# Patient Record
Sex: FEMALE | Race: WHITE | NOT HISPANIC OR LATINO | Employment: UNEMPLOYED | URBAN - METROPOLITAN AREA
[De-identification: names, ages, dates, MRNs, and addresses within clinical notes are randomized per-mention and may not be internally consistent; named-entity substitution may affect disease eponyms.]

---

## 2018-11-07 ENCOUNTER — HOSPITAL ENCOUNTER (EMERGENCY)
Facility: MEDICAL CENTER | Age: 33
End: 2018-11-07
Attending: EMERGENCY MEDICINE
Payer: MEDICAID

## 2018-11-07 VITALS
TEMPERATURE: 98.4 F | SYSTOLIC BLOOD PRESSURE: 139 MMHG | BODY MASS INDEX: 36.76 KG/M2 | HEIGHT: 63 IN | DIASTOLIC BLOOD PRESSURE: 88 MMHG | RESPIRATION RATE: 18 BRPM | WEIGHT: 207.45 LBS | HEART RATE: 81 BPM | OXYGEN SATURATION: 97 %

## 2018-11-07 DIAGNOSIS — J02.9 PHARYNGITIS, UNSPECIFIED ETIOLOGY: ICD-10-CM

## 2018-11-07 PROCEDURE — A9270 NON-COVERED ITEM OR SERVICE: HCPCS | Performed by: EMERGENCY MEDICINE

## 2018-11-07 PROCEDURE — 99284 EMERGENCY DEPT VISIT MOD MDM: CPT

## 2018-11-07 PROCEDURE — 700102 HCHG RX REV CODE 250 W/ 637 OVERRIDE(OP): Performed by: EMERGENCY MEDICINE

## 2018-11-07 RX ORDER — ACETAMINOPHEN 325 MG/1
650 TABLET ORAL ONCE
Status: COMPLETED | OUTPATIENT
Start: 2018-11-07 | End: 2018-11-07

## 2018-11-07 RX ORDER — AMOXICILLIN AND CLAVULANATE POTASSIUM 875; 125 MG/1; MG/1
1 TABLET, FILM COATED ORAL ONCE
Status: COMPLETED | OUTPATIENT
Start: 2018-11-07 | End: 2018-11-07

## 2018-11-07 RX ORDER — IBUPROFEN 600 MG/1
600 TABLET ORAL ONCE
Status: COMPLETED | OUTPATIENT
Start: 2018-11-07 | End: 2018-11-07

## 2018-11-07 RX ORDER — AMOXICILLIN AND CLAVULANATE POTASSIUM 875; 125 MG/1; MG/1
1 TABLET, FILM COATED ORAL 2 TIMES DAILY
Qty: 14 TAB | Refills: 0 | Status: SHIPPED | OUTPATIENT
Start: 2018-11-07 | End: 2018-11-14

## 2018-11-07 RX ORDER — DEXAMETHASONE 4 MG/1
4 TABLET ORAL EVERY 12 HOURS
Status: DISCONTINUED | OUTPATIENT
Start: 2018-11-07 | End: 2018-11-07 | Stop reason: HOSPADM

## 2018-11-07 RX ADMIN — ACETAMINOPHEN 650 MG: 325 TABLET, FILM COATED ORAL at 07:29

## 2018-11-07 RX ADMIN — AMOXICILLIN AND CLAVULANATE POTASSIUM 1 TABLET: 875; 125 TABLET, FILM COATED ORAL at 07:29

## 2018-11-07 RX ADMIN — IBUPROFEN 600 MG: 600 TABLET ORAL at 07:29

## 2018-11-07 RX ADMIN — DEXAMETHASONE 4 MG: 4 TABLET ORAL at 07:29

## 2018-11-07 ASSESSMENT — PAIN SCALES - GENERAL
PAINLEVEL_OUTOF10: 10
PAINLEVEL_OUTOF10: 8

## 2018-11-07 ASSESSMENT — PAIN SCALES - WONG BAKER: WONGBAKER_NUMERICALRESPONSE: HURTS AS MUCH AS POSSIBLE

## 2018-11-07 NOTE — ED NOTES
Med Rec Updated and Complete per Pt at bedside  Allergies Reviewed    Pt states she took two Amoxicillin pills 11/06/18 PM leftover from an old RX.     Pt denies any regular RX medication or OTC medication at this time.

## 2018-11-07 NOTE — ED PROVIDER NOTES
"ED Provider Note    CHIEF COMPLAINT  Chief Complaint   Patient presents with   • Sore Throat       HPI  Teresa Mike is a 32 y.o. female who presents to the emergency department the chief complaint of sore throat.  Sore throat started yesterday.  She has had a subjective fever.  No significant runny nose or cough.  No vomiting.  No nausea.  No chest pain or abdominal pain.  Patient essentially has an isolated sore throat.    REVIEW OF SYSTEMS  See HPI for further details. All other systems are negative.     PAST MEDICAL HISTORY  Past Medical History:   Diagnosis Date   • Mononucleosis        FAMILY HISTORY  History reviewed. No pertinent family history.    SOCIAL HISTORY  Social History     Social History   • Marital status: N/A     Spouse name: N/A   • Number of children: N/A   • Years of education: N/A     Social History Main Topics   • Smoking status: Current Every Day Smoker   • Smokeless tobacco: Never Used   • Alcohol use Not on file   • Drug use: Unknown   • Sexual activity: Not on file     Other Topics Concern   • Not on file     Social History Narrative   • No narrative on file       SURGICAL HISTORY  Past Surgical History:   Procedure Laterality Date   • APPENDECTOMY     • GYN SURGERY      c-sections       CURRENT MEDICATIONS  Home Medications     Reviewed by Una Osorio (Pharmacy Tech) on 11/07/18 at 0720  Med List Status: Complete   Medication Last Dose Status   AMOXICILLIN PO 11/6/2018 Active                ALLERGIES  No Known Allergies    PHYSICAL EXAM  VITAL SIGNS: /99   Pulse 80   Temp 36.9 °C (98.4 °F)   Resp 17   Ht 1.6 m (5' 3\")   Wt 94.1 kg (207 lb 7.3 oz)   SpO2 96%   BMI 36.75 kg/m²   Constitutional: Well developed, Well nourished, No acute distress, Non-toxic appearance.   HENT: Normocephalic, Atraumatic, Bilateral external ears normal, posterior pharynx is erythematous.  There is minimal associated swelling.  The uvula is midline.  There is no peritonsillar " abscess.  Airway is patent.    Eyes: PERRLA, EOMI, Conjunctiva normal, No discharge.   Neck: Normal range of motion, No tenderness, Supple, No stridor.   Cardiovascular: Regular rate and rhythm, no audible murmur  Thorax & Lungs: Clear to auscultation bilaterally.  No rales, rhonchi, or wheezing.  Abdomen: Bowel sounds normal, Soft, No tenderness, No masses, No pulsatile masses.   Skin: Warm, Dry, No erythema, No rash.   Back: No tenderness, No CVA tenderness.   Extremities: Intact distal pulses, No tenderness, No cyanosis, No clubbing.  Neurologic: Alert & oriented x 3, Normal motor function, Normal sensory function, No focal deficits noted.     EKG      RADIOLOGY/PROCEDURES      COURSE & MEDICAL DECISION MAKING  Pertinent Labs & Imaging studies reviewed. (See chart for details)    Patient presents today with a sore throat.  Physical exam is remarkable for findings consistent with pharyngitis.  No evidence of peritonsillar abscess.  I feel that empiric treatment with Augmentin and Decadron is appropriate.  First dose is provided in the emergency department.  Patient is discharged home in stable condition.  Primary care follow-up.    he patient will return for new or worsening symptoms and is stable at the time of discharge.    The patient is referred to a primary physician for blood pressure management, diabetic screening, and for all other preventative health concerns.    DISPOSITION:  Patient will be discharged home in stable condition.    FOLLOW UP:  [facility], Emergency Dept  [address]  [facility] [address]  [phone]    If symptoms worsen    [address] [address]  [phone]          OUTPATIENT MEDICATIONS:  New Prescriptions    AMOXICILLIN-CLAVULANATE (AUGMENTIN) 875-125 MG TAB    Take 1 Tab by mouth 2 times a day for 7 days.       FINAL IMPRESSION  1. Pharyngitis, unspecified etiology              Electronically signed by:  Richard Valencia, 11/7/2018 7:21 AM

## 2018-11-25 ENCOUNTER — HOSPITAL ENCOUNTER (EMERGENCY)
Facility: MEDICAL CENTER | Age: 33
End: 2018-11-25
Attending: EMERGENCY MEDICINE
Payer: MEDICAID

## 2018-11-25 VITALS
WEIGHT: 205.03 LBS | HEART RATE: 90 BPM | SYSTOLIC BLOOD PRESSURE: 140 MMHG | DIASTOLIC BLOOD PRESSURE: 92 MMHG | OXYGEN SATURATION: 97 % | TEMPERATURE: 98.5 F | RESPIRATION RATE: 18 BRPM | HEIGHT: 63 IN | BODY MASS INDEX: 36.33 KG/M2

## 2018-11-25 DIAGNOSIS — J40 BRONCHITIS: ICD-10-CM

## 2018-11-25 PROCEDURE — 99284 EMERGENCY DEPT VISIT MOD MDM: CPT

## 2018-11-25 PROCEDURE — 700111 HCHG RX REV CODE 636 W/ 250 OVERRIDE (IP): Performed by: EMERGENCY MEDICINE

## 2018-11-25 RX ORDER — AMOXICILLIN 500 MG/1
500 CAPSULE ORAL 2 TIMES DAILY
Status: SHIPPED | COMMUNITY
End: 2018-11-27

## 2018-11-25 RX ORDER — ONDANSETRON 4 MG/1
4 TABLET, ORALLY DISINTEGRATING ORAL ONCE
Qty: 10 TAB | Refills: 0 | Status: SHIPPED | OUTPATIENT
Start: 2018-11-25 | End: 2018-11-25

## 2018-11-25 RX ORDER — ONDANSETRON 4 MG/1
4 TABLET, ORALLY DISINTEGRATING ORAL ONCE
Status: COMPLETED | OUTPATIENT
Start: 2018-11-25 | End: 2018-11-25

## 2018-11-25 RX ORDER — ALBUTEROL SULFATE 90 UG/1
2 AEROSOL, METERED RESPIRATORY (INHALATION)
Qty: 1 INHALER | Refills: 2 | Status: SHIPPED | OUTPATIENT
Start: 2018-11-25 | End: 2022-05-03

## 2018-11-25 RX ORDER — BENZONATATE 100 MG/1
100 CAPSULE ORAL 3 TIMES DAILY PRN
Qty: 60 CAP | Refills: 0 | Status: SHIPPED | OUTPATIENT
Start: 2018-11-25 | End: 2022-05-03

## 2018-11-25 RX ADMIN — ONDANSETRON 4 MG: 4 TABLET, ORALLY DISINTEGRATING ORAL at 10:30

## 2018-11-25 ASSESSMENT — PAIN SCALES - GENERAL: PAINLEVEL_OUTOF10: 3

## 2018-11-25 NOTE — ED NOTES
Presents complaining of sore throat. She has just completed a bout of antibiotics to address a diagnosed strep.  Continues to experience pain.

## 2018-11-25 NOTE — ED NOTES
Med rec updated and complete  Allergies reviewed  Interviewed pt with family at bedside with permission from pt.  Pt reports no vitamins or OTC's.  Pt reports that she took an old RX of antibiotics, pt started on 11/6/2018 and took them every once in awhile.  Pt reports that she finished them 2 days ago, but never picked up the antibiotics on 11/7/2018 or the IBUPROFEN 600MG.

## 2018-11-25 NOTE — ED PROVIDER NOTES
"ED Provider Note    CHIEF COMPLAINT  Chief Complaint   Patient presents with   • Sore Throat       HPI  Teresa Mike is a 32 y.o. female who presents complaining of sore throat cough runny nose congestion.  She was seen and diagnosed with strep throat placed on Decadron which did help a lot and Augmentin which she did not finish.  She has a recurrence she feels like she has cough runny nose sore throat comes in for evaluation.  All other systems negative    REVIEW OF SYSTEMS  See HPI for further details. All other systems are negative.      PAST MEDICAL HISTORY  Past Medical History:   Diagnosis Date   • Mononucleosis        FAMILY HISTORY  History reviewed. No pertinent family history.    SOCIAL HISTORY  Social History     Social History   • Marital status:      Spouse name: N/A   • Number of children: N/A   • Years of education: N/A     Social History Main Topics   • Smoking status: Current Every Day Smoker   • Smokeless tobacco: Never Used   • Alcohol use No   • Drug use: Unknown      Comment: Marijuana   • Sexual activity: Not on file     Other Topics Concern   • Not on file     Social History Narrative   • No narrative on file       SURGICAL HISTORY  Past Surgical History:   Procedure Laterality Date   • APPENDECTOMY     • GYN SURGERY      c-sections       CURRENT MEDICATIONS  Home Medications     Reviewed by Una Guevara (Pharmacy Tech) on 11/25/18 at 0958  Med List Status: Complete   Medication Last Dose Status   amoxicillin (AMOXIL) 500 MG Cap > 2 days Active                ALLERGIES  No Known Allergies    PHYSICAL EXAM  VITAL SIGNS: /78   Pulse 89   Temp 37 °C (98.6 °F) (Temporal)   Resp 18   Ht 1.6 m (5' 3\")   Wt 93 kg (205 lb 0.4 oz)   SpO2 96%   BMI 36.32 kg/m²       Constitutional :  Well developed, Well nourished, No acute distress, Non-toxic appearance.   HENT: Pharynx is clear moist mucous membrane  Eyes: No conjunctivitis or icterus  Neck: Normal range of " motion, No tenderness, Supple, No stridor.   Lymphatic: Negative anterior cervical lymphadenopathy  Cardiovascular: Normal heart rate, Normal rhythm, No murmurs, No rubs, No gallops.   Thorax & Lungs: Wheezing with coughing  Skin: Warm, Dry, No erythema, No rash.   Extremities: Intact distal pulses, No edema, No tenderness, No cyanosis, No clubbing.           COURSE & MEDICAL DECISION MAKING  Pertinent Labs & Imaging studies reviewed. (See chart for details)  Patient has no obvious signs of pharyngitis she has wheezing with coughing she is a smoker.  I think she has upper respiratory infections precipitated bronchitis from smoking.  I placed the patient on albuterol inhaler Tessalon for cough she does not require antibiotics at this time she given return precautions and follow-up    FINAL IMPRESSION  1.  Bronchitis  2.   3.      Electronically signed by: Asaf Saeed, 11/25/2018

## 2018-11-26 NOTE — ED NOTES
Outpatient pharmacy called regarding ondansetron rx.   Rx clarified to:  Ondansetron 4 mg ODT, take 1 tab po q6 hours prn n/v, #10, no refills.    Lindsya Quiles, ScarletD

## 2018-11-27 ENCOUNTER — HOSPITAL ENCOUNTER (EMERGENCY)
Facility: MEDICAL CENTER | Age: 33
End: 2018-11-27
Attending: EMERGENCY MEDICINE
Payer: MEDICAID

## 2018-11-27 VITALS
HEIGHT: 63 IN | OXYGEN SATURATION: 96 % | TEMPERATURE: 96.8 F | SYSTOLIC BLOOD PRESSURE: 148 MMHG | RESPIRATION RATE: 14 BRPM | HEART RATE: 84 BPM | WEIGHT: 202.82 LBS | DIASTOLIC BLOOD PRESSURE: 104 MMHG | BODY MASS INDEX: 35.94 KG/M2

## 2018-11-27 DIAGNOSIS — J02.0 STREP PHARYNGITIS: ICD-10-CM

## 2018-11-27 LAB
S PYO AG THROAT QL: ABNORMAL
SIGNIFICANT IND 70042: ABNORMAL
SITE SITE: ABNORMAL
SOURCE SOURCE: ABNORMAL

## 2018-11-27 PROCEDURE — 99284 EMERGENCY DEPT VISIT MOD MDM: CPT

## 2018-11-27 PROCEDURE — 700111 HCHG RX REV CODE 636 W/ 250 OVERRIDE (IP)

## 2018-11-27 PROCEDURE — 87880 STREP A ASSAY W/OPTIC: CPT

## 2018-11-27 RX ORDER — DEXAMETHASONE SODIUM PHOSPHATE 4 MG/ML
INJECTION, SOLUTION INTRA-ARTICULAR; INTRALESIONAL; INTRAMUSCULAR; INTRAVENOUS; SOFT TISSUE
Status: COMPLETED
Start: 2018-11-27 | End: 2018-11-27

## 2018-11-27 RX ORDER — DEXAMETHASONE SODIUM PHOSPHATE 10 MG/ML
10 INJECTION, SOLUTION INTRAMUSCULAR; INTRAVENOUS ONCE
Status: DISCONTINUED | OUTPATIENT
Start: 2018-11-27 | End: 2018-11-27

## 2018-11-27 RX ORDER — DEXAMETHASONE SODIUM PHOSPHATE 4 MG/ML
10 INJECTION, SOLUTION INTRA-ARTICULAR; INTRALESIONAL; INTRAMUSCULAR; INTRAVENOUS; SOFT TISSUE ONCE
Status: COMPLETED | OUTPATIENT
Start: 2018-11-27 | End: 2018-11-27

## 2018-11-27 RX ORDER — AMOXICILLIN 500 MG/1
500 CAPSULE ORAL 3 TIMES DAILY
Qty: 30 CAP | Refills: 0 | Status: SHIPPED | OUTPATIENT
Start: 2018-11-27 | End: 2018-12-07

## 2018-11-27 RX ADMIN — DEXAMETHASONE SODIUM PHOSPHATE 10 MG: 4 INJECTION, SOLUTION INTRAMUSCULAR; INTRAVENOUS at 08:53

## 2018-11-27 RX ADMIN — DEXAMETHASONE SODIUM PHOSPHATE 10 MG: 4 INJECTION, SOLUTION INTRA-ARTICULAR; INTRALESIONAL; INTRAMUSCULAR; INTRAVENOUS; SOFT TISSUE at 08:53

## 2018-11-27 ASSESSMENT — PAIN SCALES - GENERAL
PAINLEVEL_OUTOF10: 10
PAINLEVEL_OUTOF10: 10

## 2018-11-27 NOTE — ED NOTES
"Patient states that she was seen 2 weeks ago for strep throat.  She was given \"a steroid and an antibiotic.  I felt better after the steroid and didn't take the antibiotic because I didn't feel better.  "

## 2018-11-27 NOTE — ED PROVIDER NOTES
"ED Provider Note    CHIEF COMPLAINT  Chief Complaint   Patient presents with   • Sore Throat     per report, \"I have Strep Throat,\" pt was seen on 11/25/18 for c/o same, requesting Antibx       HPI  Teresa Mike is a 32 y.o. female who presents with sore throat, previously seen on November 25, stating she was not improving, having been diagnosed with upper respiratory infection/bronchitis at the time and treated with albuterol and Tessalon.  2 weeks previous had been diagnosed with strep and treated with antibiotics and steroids.  Previous note from states patient started Augmentin but did not finish, patient states she never started the antibiotic at all.  Patient stated she was treated with Decadron last time, felt much better for 4 days and felt like she did not need the antibiotic.  Patient requesting antibiotics at this time.  No difficulty breathing.  She is able to tolerate oral intake, pain is worse with swallowing.    REVIEW OF SYSTEMS  Constitutional: Subjective fever  Respiratory: No cough or shortness of breath  ENT sore throat  Gastrointestinal: No abdominal pain  Musculoskeletal: No back pain    PAST MEDICAL HISTORY  Past Medical History:   Diagnosis Date   • Mononucleosis        FAMILY HISTORY  History reviewed. No pertinent family history.    SOCIAL HISTORY  Social History     Social History   • Marital status:      Spouse name: N/A   • Number of children: N/A   • Years of education: N/A     Social History Main Topics   • Smoking status: Current Every Day Smoker   • Smokeless tobacco: Never Used   • Alcohol use No   • Drug use: Yes      Comment: Marijuana   • Sexual activity: Not on file     Other Topics Concern   • Not on file     Social History Narrative   • No narrative on file       SURGICAL HISTORY  Past Surgical History:   Procedure Laterality Date   • APPENDECTOMY     • GYN SURGERY      c-sections       CURRENT MEDICATIONS  No current facility-administered medications on file " "prior to encounter.      Current Outpatient Prescriptions on File Prior to Encounter   Medication Sig Dispense Refill   • albuterol 108 (90 Base) MCG/ACT Aero Soln inhalation aerosol Inhale 2 Puffs by mouth every 2 hours as needed for Shortness of Breath (or cough). 1 Inhaler 2   • benzonatate (TESSALON) 100 MG Cap Take 1 Cap by mouth 3 times a day as needed for Cough. 60 Cap 0       ALLERGIES  No Known Allergies    PHYSICAL EXAM  VITAL SIGNS: /104   Pulse 84   Temp 36 °C (96.8 °F)   Resp 14   Ht 1.6 m (5' 3\")   Wt 92 kg (202 lb 13.2 oz)   SpO2 96%   BMI 35.93 kg/m²   Constitutional:  Well nourished, No acute distress.   HENT: Posterior pharynx erythematous change, no asymmetric swelling, no exudate  Lymphatics: Bilateral tender submandibular adenopathy  Eyes:  Conjunctiva normal, No discharge.    Cardiovascular: The heart is regular rhythm, normal rate  Pulmonary: Lungs are clear, no wheezing, no rales  Skin: No cyanosis.   Musculoskeletal: Neck nontender   Neurologic: speech is clear, no ataxia   Psychiatric:  Mood normal.  Cooperative    Results for orders placed or performed during the hospital encounter of 11/27/18   RAPID STREP,CULT IF INDICATED   Result Value Ref Range    Significant Indicator POS (POS)     Source THRT     Site THROAT     Rapid Strep Screen Positive for Group A streptococcus. (A)            COURSE & MEDICAL DECISION MAKING  Pertinent Labs & Imaging studies reviewed. (See chart for details)  Patient's strep test returns positive, appears to be persistent secondary to noncompliance with medical regimen.  Patient is prescribed antibiotics, repeat dose of Decadron has been provided.  She is well-appearing upon discharge.  I have advised patient to see  for recheck if not better in 1 week, returning sooner if worse or for any concerns    FINAL IMPRESSION     1. Strep pharyngitis                 Electronically signed by: Eddie Wong, 12/1/2018 6:21 AM    "

## 2018-11-27 NOTE — ED NOTES
ERP in to assess patient and discuss the importance of taking the antibiotic to treat the infection.

## 2018-11-27 NOTE — ED NOTES
"Pt was roomed to 6H. Pt states \"I'm not talking to a doctor out here\" This RN asked pt if she preferred to wait for open room. Pt stated \"Yes\"  Pt was returned to lobby to wait for open room.   "

## 2021-11-17 ENCOUNTER — TELEPHONE (OUTPATIENT)
Dept: SCHEDULING | Facility: IMAGING CENTER | Age: 36
End: 2021-11-17

## 2022-01-04 ENCOUNTER — TELEPHONE (OUTPATIENT)
Dept: SCHEDULING | Facility: IMAGING CENTER | Age: 37
End: 2022-01-04

## 2022-01-17 ENCOUNTER — TELEPHONE (OUTPATIENT)
Dept: SCHEDULING | Facility: IMAGING CENTER | Age: 37
End: 2022-01-17

## 2022-05-02 ENCOUNTER — TELEPHONE (OUTPATIENT)
Dept: SCHEDULING | Facility: IMAGING CENTER | Age: 37
End: 2022-05-02

## 2022-05-03 ENCOUNTER — OFFICE VISIT (OUTPATIENT)
Dept: MEDICAL GROUP | Facility: MEDICAL CENTER | Age: 37
End: 2022-05-03
Attending: NURSE PRACTITIONER
Payer: MEDICAID

## 2022-05-03 VITALS
SYSTOLIC BLOOD PRESSURE: 130 MMHG | TEMPERATURE: 96.7 F | BODY MASS INDEX: 39.35 KG/M2 | RESPIRATION RATE: 16 BRPM | HEIGHT: 64 IN | DIASTOLIC BLOOD PRESSURE: 82 MMHG | WEIGHT: 230.5 LBS | HEART RATE: 70 BPM | OXYGEN SATURATION: 100 %

## 2022-05-03 DIAGNOSIS — Z13.228 SCREENING FOR ENDOCRINE, NUTRITIONAL, METABOLIC AND IMMUNITY DISORDER: ICD-10-CM

## 2022-05-03 DIAGNOSIS — Z23 NEED FOR VACCINATION: ICD-10-CM

## 2022-05-03 DIAGNOSIS — Z13.0 SCREENING FOR ENDOCRINE, NUTRITIONAL, METABOLIC AND IMMUNITY DISORDER: ICD-10-CM

## 2022-05-03 DIAGNOSIS — F43.21 GRIEF: ICD-10-CM

## 2022-05-03 DIAGNOSIS — F43.21 SITUATIONAL DEPRESSION: ICD-10-CM

## 2022-05-03 DIAGNOSIS — Z13.21 SCREENING FOR ENDOCRINE, NUTRITIONAL, METABOLIC AND IMMUNITY DISORDER: ICD-10-CM

## 2022-05-03 DIAGNOSIS — Z13.29 SCREENING FOR ENDOCRINE, NUTRITIONAL, METABOLIC AND IMMUNITY DISORDER: ICD-10-CM

## 2022-05-03 DIAGNOSIS — Z76.89 ENCOUNTER TO ESTABLISH CARE: ICD-10-CM

## 2022-05-03 PROCEDURE — 99204 OFFICE O/P NEW MOD 45 MIN: CPT | Performed by: NURSE PRACTITIONER

## 2022-05-03 PROCEDURE — 99203 OFFICE O/P NEW LOW 30 MIN: CPT | Mod: 25 | Performed by: NURSE PRACTITIONER

## 2022-05-03 PROCEDURE — 90715 TDAP VACCINE 7 YRS/> IM: CPT

## 2022-05-03 ASSESSMENT — PATIENT HEALTH QUESTIONNAIRE - PHQ9
CLINICAL INTERPRETATION OF PHQ2 SCORE: 4
5. POOR APPETITE OR OVEREATING: 3 - NEARLY EVERY DAY
SUM OF ALL RESPONSES TO PHQ QUESTIONS 1-9: 19

## 2022-05-03 NOTE — ASSESSMENT & PLAN NOTE
Discussed health history and maintenance   Flu vaccine - Not available   Colon Ca screening - Not applicable   Mammogram- Not Applicable   Pap smear - Done within last 3 years   STD Screening- Declined   Preventative screening labs ordered - Will follow up with me in 3 weeks   Tdap provided in clinic

## 2022-05-03 NOTE — ASSESSMENT & PLAN NOTE
Acute-   Discussed possible medication to help manage symptoms until she can get established with therapist.   Referral urgently placed to Psychology  Referral to Psychiatrist  Will be following up with me in 3 weeks to ensure she has been able to establish with therapy and go over labs  No concerns for self harm or SI  PHQ9 in office completed:  Depression Screening    Little interest or pleasure in doing things?  2 - more than half the days   Feeling down, depressed , or hopeless? 2 - more than half the days   Trouble falling or staying asleep, or sleeping too much?  2 - more than half the days   Feeling tired or having little energy?  3 - nearly every day   Poor appetite or overeating?  3 - nearly every day   Feeling bad about yourself - or that you are a failure or have let yourself or your family down? 2 - more than half the days   Trouble concentrating on things, such as reading the newspaper or watching television? 3 - nearly every day   Moving or speaking so slowly that other people could have noticed.  Or the opposite - being so fidgety or restless that you have been moving around a lot more than usual?  2 - more than half the days   Thoughts that you would be better off dead, or of hurting yourself?  0 - not at all   Patient Health Questionnaire Score: 19       If depressive symptoms identified deferred to follow up visit unless specifically addressed in assesment and plan.    Interpretation of PHQ-9 Total Score   Score Severity   1-4 No Depression   5-9 Mild Depression   10-14 Moderate Depression   15-19 Moderately Severe Depression   20-27 Severe Depression

## 2022-05-03 NOTE — PROGRESS NOTES
Chief Complaint   Patient presents with   • Establish Care       Subjective:     HPI:   Teresa Mike is a 36 y.o. female here to discuss the evaluation and management of:        Problem   Situational Depression    Patient very tearful in office, recently lost her  secondary to an epileptic seizure a few months ago. Has been trying to stay strong for her young children (10 & 4). Patient has been having trouble getting through her grief, and processing to move forward. Would like to see therapist and psychiatrist.      Encounter to Establish Care    Patient here to establish care. Recently lost her  secondary to an epileptic seizure and had been trying to stay strong for her 10 and 4 year old children. Would now like to get her health checked out to ensure she is healthy for them.            ROS  See HPI       No Known Allergies    Current medicines (including changes today)  No current outpatient medications on file.     No current facility-administered medications for this visit.       Social History     Tobacco Use   • Smoking status: Former Smoker   • Smokeless tobacco: Never Used   • Tobacco comment: Pt state quit date is DEC2021 CS05/03/2021   Vaping Use   • Vaping Use: Never used   Substance Use Topics   • Alcohol use: No   • Drug use: Yes     Types: Marijuana, Oral     Comment: Pt states she uses 2-3 time per week to help with anxiety and sleep since her  passed apx 8 mo. ago CS05/03/2021       Patient Active Problem List    Diagnosis Date Noted   • Situational depression 05/03/2022   • Encounter to establish care 05/03/2022       Family History   Problem Relation Age of Onset   • Hypertension Mother    • Hypertension Maternal Aunt    • Breast Cancer Maternal Aunt 60   • Hypertension Maternal Grandmother           Objective:     /82 (BP Location: Left arm, Patient Position: Sitting, BP Cuff Size: Adult)   Pulse 70   Temp 35.9 °C (96.7 °F) (Temporal)   Resp 16   Ht 1.626  "m (5' 4\")   Wt 105 kg (230 lb 8 oz)   SpO2 100%  Body mass index is 39.57 kg/m².    Physical Exam:  Physical Exam  Vitals reviewed.   Constitutional:       General: She is awake.      Appearance: Normal appearance. She is well-developed.   HENT:      Head: Normocephalic.      Nose: Nose normal.      Mouth/Throat:      Mouth: Mucous membranes are moist.      Pharynx: Oropharynx is clear. Posterior oropharyngeal erythema present. No oropharyngeal exudate.   Eyes:      Conjunctiva/sclera: Conjunctivae normal.      Pupils: Pupils are equal, round, and reactive to light.   Cardiovascular:      Rate and Rhythm: Normal rate and regular rhythm.      Heart sounds: Normal heart sounds.   Pulmonary:      Effort: Pulmonary effort is normal. No respiratory distress.      Breath sounds: Normal breath sounds. No wheezing.   Musculoskeletal:      Cervical back: Neck supple. No tenderness.   Lymphadenopathy:      Cervical: No cervical adenopathy.   Skin:     General: Skin is warm and dry.   Neurological:      Mental Status: She is alert and oriented to person, place, and time.   Psychiatric:         Mood and Affect: Mood is depressed. Affect is tearful.         Behavior: Behavior normal. Behavior is cooperative.         Assessment and Plan:     The following treatment plan was discussed:    Problem List Items Addressed This Visit     Situational depression     Acute-   Discussed possible medication to help manage symptoms until she can get established with therapist.   Referral urgently placed to Psychology  Referral to Psychiatrist  Will be following up with me in 3 weeks to ensure she has been able to establish with therapy and go over labs  No concerns for self harm or SI  PHQ9 in office completed:  Depression Screening    Little interest or pleasure in doing things?  2 - more than half the days   Feeling down, depressed , or hopeless? 2 - more than half the days   Trouble falling or staying asleep, or sleeping too much?  2 - " more than half the days   Feeling tired or having little energy?  3 - nearly every day   Poor appetite or overeating?  3 - nearly every day   Feeling bad about yourself - or that you are a failure or have let yourself or your family down? 2 - more than half the days   Trouble concentrating on things, such as reading the newspaper or watching television? 3 - nearly every day   Moving or speaking so slowly that other people could have noticed.  Or the opposite - being so fidgety or restless that you have been moving around a lot more than usual?  2 - more than half the days   Thoughts that you would be better off dead, or of hurting yourself?  0 - not at all   Patient Health Questionnaire Score: 19       If depressive symptoms identified deferred to follow up visit unless specifically addressed in assesment and plan.    Interpretation of PHQ-9 Total Score   Score Severity   1-4 No Depression   5-9 Mild Depression   10-14 Moderate Depression   15-19 Moderately Severe Depression   20-27 Severe Depression             Relevant Orders    Referral to Psychology    Encounter to establish care     Discussed health history and maintenance   Flu vaccine - Not available   Colon Ca screening - Not applicable   Mammogram- Not Applicable   Pap smear - Done within last 3 years   STD Screening- Declined   Preventative screening labs ordered - Will follow up with me in 3 weeks   Tdap provided in clinic              Other Visit Diagnoses     Grief        Relevant Orders    Referral to Psychiatry    Referral to Psychology    Need for vaccination        Relevant Orders    Tdap =>8yo IM    Screening for endocrine, nutritional, metabolic and immunity disorder        Relevant Orders    HEMOGLOBIN A1C    Comp Metabolic Panel    Lipid Profile    TSH    VITAMIN D,25 HYDROXY    FREE THYROXINE    HEP C VIRUS ANTIBODY    CBC WITH DIFFERENTIAL          Any change or worsening of signs or symptoms, patient encouraged to follow-up or report to  emergency room for further evaluation. Patient verbalizes understanding and agrees.    Follow-Up: Return in about 3 weeks (around 5/24/2022) for Follow up Labs, Follow up depression/anxiety.      PLEASE NOTE: This dictation was created using voice recognition software. I have made every reasonable attempt to correct obvious errors, but I expect that there are errors of grammar and possibly content that I did not discover before finalizing the note.

## 2022-05-06 ENCOUNTER — HOSPITAL ENCOUNTER (OUTPATIENT)
Dept: LAB | Facility: MEDICAL CENTER | Age: 37
End: 2022-05-06
Attending: NURSE PRACTITIONER
Payer: MEDICAID

## 2022-05-06 DIAGNOSIS — Z13.228 SCREENING FOR ENDOCRINE, NUTRITIONAL, METABOLIC AND IMMUNITY DISORDER: ICD-10-CM

## 2022-05-06 DIAGNOSIS — Z13.0 SCREENING FOR ENDOCRINE, NUTRITIONAL, METABOLIC AND IMMUNITY DISORDER: ICD-10-CM

## 2022-05-06 DIAGNOSIS — Z13.29 SCREENING FOR ENDOCRINE, NUTRITIONAL, METABOLIC AND IMMUNITY DISORDER: ICD-10-CM

## 2022-05-06 DIAGNOSIS — Z13.21 SCREENING FOR ENDOCRINE, NUTRITIONAL, METABOLIC AND IMMUNITY DISORDER: ICD-10-CM

## 2022-05-06 LAB
25(OH)D3 SERPL-MCNC: 24 NG/ML (ref 30–100)
ALBUMIN SERPL BCP-MCNC: 4.3 G/DL (ref 3.2–4.9)
ALBUMIN/GLOB SERPL: 1.3 G/DL
ALP SERPL-CCNC: 53 U/L (ref 30–99)
ALT SERPL-CCNC: 12 U/L (ref 2–50)
ANION GAP SERPL CALC-SCNC: 12 MMOL/L (ref 7–16)
AST SERPL-CCNC: 14 U/L (ref 12–45)
BASOPHILS # BLD AUTO: 0.5 % (ref 0–1.8)
BASOPHILS # BLD: 0.04 K/UL (ref 0–0.12)
BILIRUB SERPL-MCNC: 0.6 MG/DL (ref 0.1–1.5)
BUN SERPL-MCNC: 10 MG/DL (ref 8–22)
CALCIUM SERPL-MCNC: 9.3 MG/DL (ref 8.5–10.5)
CHLORIDE SERPL-SCNC: 105 MMOL/L (ref 96–112)
CHOLEST SERPL-MCNC: 178 MG/DL (ref 100–199)
CO2 SERPL-SCNC: 22 MMOL/L (ref 20–33)
CREAT SERPL-MCNC: 0.68 MG/DL (ref 0.5–1.4)
EOSINOPHIL # BLD AUTO: 0.1 K/UL (ref 0–0.51)
EOSINOPHIL NFR BLD: 1.4 % (ref 0–6.9)
ERYTHROCYTE [DISTWIDTH] IN BLOOD BY AUTOMATED COUNT: 38.5 FL (ref 35.9–50)
EST. AVERAGE GLUCOSE BLD GHB EST-MCNC: 100 MG/DL
GFR SERPLBLD CREATININE-BSD FMLA CKD-EPI: 116 ML/MIN/1.73 M 2
GLOBULIN SER CALC-MCNC: 3.3 G/DL (ref 1.9–3.5)
GLUCOSE SERPL-MCNC: 84 MG/DL (ref 65–99)
HBA1C MFR BLD: 5.1 % (ref 4–5.6)
HCT VFR BLD AUTO: 43.6 % (ref 37–47)
HCV AB SER QL: NORMAL
HDLC SERPL-MCNC: 67 MG/DL
HGB BLD-MCNC: 14.8 G/DL (ref 12–16)
IMM GRANULOCYTES # BLD AUTO: 0.03 K/UL (ref 0–0.11)
IMM GRANULOCYTES NFR BLD AUTO: 0.4 % (ref 0–0.9)
LDLC SERPL CALC-MCNC: 103 MG/DL
LYMPHOCYTES # BLD AUTO: 1.74 K/UL (ref 1–4.8)
LYMPHOCYTES NFR BLD: 23.5 % (ref 22–41)
MCH RBC QN AUTO: 28.9 PG (ref 27–33)
MCHC RBC AUTO-ENTMCNC: 33.9 G/DL (ref 33.6–35)
MCV RBC AUTO: 85.2 FL (ref 81.4–97.8)
MONOCYTES # BLD AUTO: 0.52 K/UL (ref 0–0.85)
MONOCYTES NFR BLD AUTO: 7 % (ref 0–13.4)
NEUTROPHILS # BLD AUTO: 4.96 K/UL (ref 2–7.15)
NEUTROPHILS NFR BLD: 67.2 % (ref 44–72)
NRBC # BLD AUTO: 0 K/UL
NRBC BLD-RTO: 0 /100 WBC
PLATELET # BLD AUTO: 344 K/UL (ref 164–446)
PMV BLD AUTO: 11.2 FL (ref 9–12.9)
POTASSIUM SERPL-SCNC: 4.2 MMOL/L (ref 3.6–5.5)
PROT SERPL-MCNC: 7.6 G/DL (ref 6–8.2)
RBC # BLD AUTO: 5.12 M/UL (ref 4.2–5.4)
SODIUM SERPL-SCNC: 139 MMOL/L (ref 135–145)
T4 FREE SERPL-MCNC: 1.27 NG/DL (ref 0.93–1.7)
TRIGL SERPL-MCNC: 38 MG/DL (ref 0–149)
TSH SERPL DL<=0.005 MIU/L-ACNC: 0.51 UIU/ML (ref 0.38–5.33)
WBC # BLD AUTO: 7.4 K/UL (ref 4.8–10.8)

## 2022-05-06 PROCEDURE — 85025 COMPLETE CBC W/AUTO DIFF WBC: CPT

## 2022-05-06 PROCEDURE — 36415 COLL VENOUS BLD VENIPUNCTURE: CPT

## 2022-05-06 PROCEDURE — 84443 ASSAY THYROID STIM HORMONE: CPT

## 2022-05-06 PROCEDURE — 86803 HEPATITIS C AB TEST: CPT

## 2022-05-06 PROCEDURE — 80053 COMPREHEN METABOLIC PANEL: CPT

## 2022-05-06 PROCEDURE — 83036 HEMOGLOBIN GLYCOSYLATED A1C: CPT

## 2022-05-06 PROCEDURE — 84439 ASSAY OF FREE THYROXINE: CPT

## 2022-05-06 PROCEDURE — 82306 VITAMIN D 25 HYDROXY: CPT

## 2022-05-06 PROCEDURE — 80061 LIPID PANEL: CPT

## 2022-06-14 ENCOUNTER — HOSPITAL ENCOUNTER (OUTPATIENT)
Dept: RADIOLOGY | Facility: MEDICAL CENTER | Age: 37
End: 2022-06-14
Attending: OBSTETRICS & GYNECOLOGY
Payer: MEDICAID

## 2022-06-14 DIAGNOSIS — N63.15 BREAST LUMP ON RIGHT SIDE AT 3 O'CLOCK POSITION: ICD-10-CM

## 2022-06-14 DIAGNOSIS — N64.4 MASTODYNIA: ICD-10-CM

## 2022-06-14 DIAGNOSIS — R92.30 BREAST DENSITY: ICD-10-CM

## 2022-06-14 PROCEDURE — 76642 ULTRASOUND BREAST LIMITED: CPT | Mod: RT

## 2022-06-14 PROCEDURE — G0279 TOMOSYNTHESIS, MAMMO: HCPCS

## 2022-11-28 ENCOUNTER — OFFICE VISIT (OUTPATIENT)
Dept: MEDICAL GROUP | Facility: MEDICAL CENTER | Age: 37
End: 2022-11-28
Attending: NURSE PRACTITIONER
Payer: MEDICAID

## 2022-11-28 VITALS
OXYGEN SATURATION: 96 % | TEMPERATURE: 98.6 F | RESPIRATION RATE: 19 BRPM | HEIGHT: 64 IN | SYSTOLIC BLOOD PRESSURE: 140 MMHG | DIASTOLIC BLOOD PRESSURE: 92 MMHG | WEIGHT: 235.6 LBS | BODY MASS INDEX: 40.22 KG/M2 | HEART RATE: 60 BPM

## 2022-11-28 DIAGNOSIS — K64.9 HEMORRHOIDS, UNSPECIFIED HEMORRHOID TYPE: ICD-10-CM

## 2022-11-28 DIAGNOSIS — E55.9 VITAMIN D DEFICIENCY: ICD-10-CM

## 2022-11-28 DIAGNOSIS — L21.0 DANDRUFF IN ADULT: ICD-10-CM

## 2022-11-28 DIAGNOSIS — F43.21 SITUATIONAL DEPRESSION: ICD-10-CM

## 2022-11-28 PROBLEM — Z76.89 ENCOUNTER TO ESTABLISH CARE: Status: RESOLVED | Noted: 2022-05-03 | Resolved: 2022-11-28

## 2022-11-28 PROCEDURE — 99212 OFFICE O/P EST SF 10 MIN: CPT | Performed by: NURSE PRACTITIONER

## 2022-11-28 PROCEDURE — 99214 OFFICE O/P EST MOD 30 MIN: CPT | Performed by: NURSE PRACTITIONER

## 2022-11-28 RX ORDER — SERTRALINE HYDROCHLORIDE 25 MG/1
25 TABLET, FILM COATED ORAL DAILY
Qty: 30 TABLET | Refills: 2 | Status: SHIPPED | OUTPATIENT
Start: 2022-11-28 | End: 2023-01-06 | Stop reason: SDUPTHER

## 2022-11-28 RX ORDER — HYDROCORTISONE ACETATE 25 MG/1
25 SUPPOSITORY RECTAL EVERY 12 HOURS
Qty: 24 SUPPOSITORY | Refills: 0 | Status: SHIPPED | OUTPATIENT
Start: 2022-11-28 | End: 2022-11-29

## 2022-11-28 RX ORDER — ERGOCALCIFEROL 1.25 MG/1
50000 CAPSULE ORAL
Qty: 12 CAPSULE | Refills: 0 | Status: SHIPPED | OUTPATIENT
Start: 2022-11-28 | End: 2023-01-06 | Stop reason: SDUPTHER

## 2022-11-28 ASSESSMENT — FIBROSIS 4 INDEX: FIB4 SCORE: 0.41

## 2022-11-28 NOTE — PROGRESS NOTES
No chief complaint on file.      Subjective:     HPI:   Teresa Mike is a 36 y.o. female here to discuss the evaluation and management of:      Problem   Vitamin D Deficiency    Teresa was noted to be deficient on vitamin D on previous lab work.   Latest Reference Range & Units 05/06/22 07:38   25-Hydroxy   Vitamin D 25 30 - 100 ng/mL 24 (L)   (L): Data is abnormally low     Hemorrhoids    She states that she has been dealing with hemorrhoids for quite some time now and that they are getting to be a little too much to handle.  Has been trying Preparation H which give some initial relief however it is short-lived.  States these run in her family as she has had aunts and uncles that have had to have them removed.     Dandruff in Adult    Teresa has been noticing more dry scalp/dandruff as well as some hair thinning recently.  Has tried every over-the-counter shampoo such as Selsun Blue, head and shoulders and other dandruff shampoos.  She states that her mother had similar issues and was given a prescription strength shampoo previously that cleared her head.  She is unsure what the name of this is but will try to find it from her mom.     Situational Depression    Teresa continues to struggle with depression secondary to her recent loss.  Is struggling to deal with daily activities and continue to keep her spirits up to take care of her young children.  Feels that is possibly time to finally start medication to help with this.  She has started therapy and states that it feels good at the time to let things out and release it however not sure its been beneficial in the long-term.     Encounter to Establish Care (Resolved)    Patient here to establish care. Recently lost her  secondary to an epileptic seizure and had been trying to stay strong for her 10 and 4 year old children. Would now like to get her health checked out to ensure she is healthy for them.            ROS  See HPI     No Known  "Allergies    Current medicines (including changes today)  Current Outpatient Medications   Medication Sig Dispense Refill    hydrocortisone (ANUSOL-HC) 25 MG Suppos Insert 1 Suppository into the rectum every 12 hours. 24 Suppository 0    ergocalciferol (DRISDOL) 21142 UNIT capsule Take 1 Capsule by mouth every 7 days. 12 Capsule 0    sertraline (ZOLOFT) 25 MG tablet Take 1 Tablet by mouth every day. 30 Tablet 2     No current facility-administered medications for this visit.       Social History     Tobacco Use    Smoking status: Former    Smokeless tobacco: Never    Tobacco comments:     Pt state quit date is DEC2021 05/03/2021   Vaping Use    Vaping Use: Never used   Substance Use Topics    Alcohol use: No    Drug use: Yes     Types: Marijuana, Oral     Comment: Pt states she uses 2-3 time per week to help with anxiety and sleep since her  passed apx 8 mo. ago CS05/03/2021       Patient Active Problem List    Diagnosis Date Noted    Vitamin D deficiency 11/28/2022    Hemorrhoids 11/28/2022    Dandruff in adult 11/28/2022    Situational depression 05/03/2022       Family History   Problem Relation Age of Onset    Hypertension Mother     Hypertension Maternal Aunt     Breast Cancer Maternal Aunt 60    Hypertension Maternal Grandmother           Objective:     BP (!) 140/92 (BP Location: Right arm, Patient Position: Sitting, BP Cuff Size: Adult)   Pulse 60   Temp 37 °C (98.6 °F) (Skin)   Resp 19   Ht 1.626 m (5' 4\")   Wt 107 kg (235 lb 9.6 oz)   SpO2 96%  Body mass index is 40.44 kg/m².    Physical Exam:  Physical Exam  Vitals reviewed.   Constitutional:       General: She is awake.      Appearance: Normal appearance. She is well-developed.   HENT:      Head: Normocephalic.   Eyes:      Conjunctiva/sclera: Conjunctivae normal.   Cardiovascular:      Rate and Rhythm: Normal rate.   Pulmonary:      Effort: Pulmonary effort is normal. No respiratory distress.   Musculoskeletal:      Cervical back: Neck " supple.   Skin:     General: Skin is warm and dry.   Neurological:      Mental Status: She is alert and oriented to person, place, and time.   Psychiatric:         Mood and Affect: Affect is labile and tearful.         Behavior: Behavior normal. Behavior is cooperative.            Assessment and Plan:     The following treatment plan was discussed:    Problem List Items Addressed This Visit       Situational depression     Ongoing-  Discussed patient's depression as well as to recent weight gain, and binge eating.  Will trial patient on Zoloft which has been shown to have effect on depression, as well as binge eating.  Zoloft 25 mg tablet daily- discussed with patient that she needs to take this daily and that it may not be fully therapeutic for approximately 6 weeks, that if she starts noticing any unwanted side effects such as suicidal ideation increased anger that we will discontinue and try another medication.  Patient will follow up with me in 4 to 6 weeks to see if dosage needs to be adjusted  Continue with therapy at this time         Relevant Medications    sertraline (ZOLOFT) 25 MG tablet    Vitamin D deficiency     Ongoing-  Given that it is now winter months with limited sunshine effective to increase vitamin D we will start her on ergocalciferol 50,000 units once weekly p.o.  Once completing high-dose therapy will transition to over-the-counter supplementation         Relevant Medications    ergocalciferol (DRISDOL) 37093 UNIT capsule    Hemorrhoids     Ongoing-  Anusol suppositories ordered  Referral to GI placed to discuss possible banding and removal  Encouraged increased water intake and possible stool softener if she is having to strain during bowel movements         Relevant Medications    hydrocortisone (ANUSOL-HC) 25 MG Suppos    Other Relevant Orders    Referral to Gastroenterology    Dandruff in adult     Ongoing-  Referral to dermatology to discuss possible options  We will have her send  prescription name to me via [employer]         Relevant Orders    Referral to Dermatology       Any change or worsening of signs or symptoms, patient encouraged to follow-up or report to emergency room for further evaluation. Patient verbalizes understanding and agrees.    Follow-Up: 4 to 6 weeks to reevaluate medications and discuss possible weight loss options      PLEASE NOTE: This dictation was created using voice recognition software. I have made every reasonable attempt to correct obvious errors, but I expect that there are errors of grammar and possibly content that I did not discover before finalizing the note.

## 2022-11-28 NOTE — ASSESSMENT & PLAN NOTE
Ongoing-  Discussed patient's depression as well as to recent weight gain, and binge eating.  Will trial patient on Zoloft which has been shown to have effect on depression, as well as binge eating.  Zoloft 25 mg tablet daily- discussed with patient that she needs to take this daily and that it may not be fully therapeutic for approximately 6 weeks, that if she starts noticing any unwanted side effects such as suicidal ideation increased anger that we will discontinue and try another medication.  Patient will follow up with me in 4 to 6 weeks to see if dosage needs to be adjusted  Continue with therapy at this time

## 2022-11-28 NOTE — ASSESSMENT & PLAN NOTE
Ongoing-  Referral to dermatology to discuss possible options  We will have her send prescription name to me via [employer]

## 2022-11-28 NOTE — ASSESSMENT & PLAN NOTE
Ongoing-  Anusol suppositories ordered  Referral to GI placed to discuss possible banding and removal  Encouraged increased water intake and possible stool softener if she is having to strain during bowel movements

## 2022-11-28 NOTE — ASSESSMENT & PLAN NOTE
Ongoing-  Given that it is now winter months with limited sunshine effective to increase vitamin D we will start her on ergocalciferol 50,000 units once weekly p.o.  Once completing high-dose therapy will transition to over-the-counter supplementation

## 2022-11-29 DIAGNOSIS — K64.9 HEMORRHOIDS, UNSPECIFIED HEMORRHOID TYPE: ICD-10-CM

## 2022-11-29 RX ORDER — HYDROCORTISONE 25 MG/G
1 CREAM TOPICAL DAILY
Qty: 30 G | Refills: 0 | Status: SHIPPED | OUTPATIENT
Start: 2022-11-29 | End: 2023-02-21 | Stop reason: SDUPTHER

## 2023-01-06 DIAGNOSIS — F43.21 SITUATIONAL DEPRESSION: ICD-10-CM

## 2023-01-06 DIAGNOSIS — E55.9 VITAMIN D DEFICIENCY: ICD-10-CM

## 2023-01-10 RX ORDER — ERGOCALCIFEROL 1.25 MG/1
50000 CAPSULE ORAL
Qty: 12 CAPSULE | Refills: 0 | Status: SHIPPED | OUTPATIENT
Start: 2023-01-10

## 2023-01-10 RX ORDER — SERTRALINE HYDROCHLORIDE 25 MG/1
25 TABLET, FILM COATED ORAL DAILY
Qty: 30 TABLET | Refills: 2 | Status: SHIPPED | OUTPATIENT
Start: 2023-01-10 | End: 2023-12-08

## 2023-02-21 DIAGNOSIS — K64.9 HEMORRHOIDS, UNSPECIFIED HEMORRHOID TYPE: ICD-10-CM

## 2023-02-21 RX ORDER — HYDROCORTISONE 25 MG/G
1 CREAM TOPICAL DAILY
Qty: 30 G | Refills: 0 | Status: SHIPPED | OUTPATIENT
Start: 2023-02-21

## 2023-12-08 ENCOUNTER — OFFICE VISIT (OUTPATIENT)
Dept: MEDICAL GROUP | Facility: MEDICAL CENTER | Age: 38
End: 2023-12-08
Attending: NURSE PRACTITIONER
Payer: MEDICAID

## 2023-12-08 VITALS
DIASTOLIC BLOOD PRESSURE: 82 MMHG | TEMPERATURE: 97.3 F | OXYGEN SATURATION: 97 % | BODY MASS INDEX: 36.29 KG/M2 | SYSTOLIC BLOOD PRESSURE: 126 MMHG | HEIGHT: 64 IN | RESPIRATION RATE: 17 BRPM | WEIGHT: 212.6 LBS | HEART RATE: 72 BPM

## 2023-12-08 DIAGNOSIS — E66.09 CLASS 2 OBESITY DUE TO EXCESS CALORIES WITH BODY MASS INDEX (BMI) OF 36.0 TO 36.9 IN ADULT, UNSPECIFIED WHETHER SERIOUS COMORBIDITY PRESENT: ICD-10-CM

## 2023-12-08 DIAGNOSIS — Z11.3 ROUTINE SCREENING FOR STI (SEXUALLY TRANSMITTED INFECTION): ICD-10-CM

## 2023-12-08 DIAGNOSIS — E55.9 VITAMIN D DEFICIENCY: ICD-10-CM

## 2023-12-08 DIAGNOSIS — R68.89 FLU-LIKE SYMPTOMS: ICD-10-CM

## 2023-12-08 DIAGNOSIS — Z11.59 NEED FOR HEPATITIS C SCREENING TEST: ICD-10-CM

## 2023-12-08 DIAGNOSIS — K59.00 CONSTIPATION, UNSPECIFIED CONSTIPATION TYPE: ICD-10-CM

## 2023-12-08 PROCEDURE — 99214 OFFICE O/P EST MOD 30 MIN: CPT | Performed by: NURSE PRACTITIONER

## 2023-12-08 PROCEDURE — 3074F SYST BP LT 130 MM HG: CPT | Performed by: NURSE PRACTITIONER

## 2023-12-08 PROCEDURE — 3079F DIAST BP 80-89 MM HG: CPT | Performed by: NURSE PRACTITIONER

## 2023-12-08 PROCEDURE — 99213 OFFICE O/P EST LOW 20 MIN: CPT | Performed by: NURSE PRACTITIONER

## 2023-12-08 RX ORDER — POLYETHYLENE GLYCOL 3350 17 G/17G
17 POWDER, FOR SOLUTION ORAL DAILY
Qty: 850 G | Refills: 3 | Status: SHIPPED | OUTPATIENT
Start: 2023-12-08

## 2023-12-08 RX ORDER — POLYETHYLENE GLYCOL 3350 17 G/17G
17 POWDER, FOR SOLUTION ORAL DAILY
Qty: 850 G | Refills: 3 | Status: SHIPPED | OUTPATIENT
Start: 2023-12-08 | End: 2023-12-08 | Stop reason: SDUPTHER

## 2023-12-08 ASSESSMENT — FIBROSIS 4 INDEX: FIB4 SCORE: 0.42

## 2023-12-08 NOTE — LETTER
December 8, 2023    To Whom It May Concern:         This is confirmation that Teresa Mike attended her scheduled appointment with FELIBERTO Ramesh on 12/08/23. Please excuse from work today.          If you have any questions please do not hesitate to call me at the phone number listed below.    Sincerely,          BRENDA RameshRChantelN.  [phone]

## 2023-12-08 NOTE — LETTER
December 8, 2023    To Whom It May Concern:         This is confirmation that Teresa Mike attended her scheduled appointment with FELIBERTO Ramesh on 12/08/23. Please excuse from work today.          If you have any questions please do not hesitate to call me at the phone number listed below.    Sincerely,          BRENDA RameshRChantelN.  [phone]

## 2023-12-19 ENCOUNTER — HOSPITAL ENCOUNTER (OUTPATIENT)
Dept: LAB | Facility: MEDICAL CENTER | Age: 38
End: 2023-12-19
Attending: NURSE PRACTITIONER
Payer: MEDICAID

## 2023-12-19 DIAGNOSIS — E66.09 CLASS 2 OBESITY DUE TO EXCESS CALORIES WITH BODY MASS INDEX (BMI) OF 36.0 TO 36.9 IN ADULT, UNSPECIFIED WHETHER SERIOUS COMORBIDITY PRESENT: ICD-10-CM

## 2023-12-19 DIAGNOSIS — E55.9 VITAMIN D DEFICIENCY: ICD-10-CM

## 2023-12-19 DIAGNOSIS — Z11.3 ROUTINE SCREENING FOR STI (SEXUALLY TRANSMITTED INFECTION): ICD-10-CM

## 2023-12-19 DIAGNOSIS — Z11.59 NEED FOR HEPATITIS C SCREENING TEST: ICD-10-CM

## 2023-12-19 LAB
25(OH)D3 SERPL-MCNC: 25 NG/ML (ref 30–100)
ALBUMIN SERPL BCP-MCNC: 4.1 G/DL (ref 3.2–4.9)
ALBUMIN/GLOB SERPL: 1.3 G/DL
ALP SERPL-CCNC: 47 U/L (ref 30–99)
ALT SERPL-CCNC: 16 U/L (ref 2–50)
ANION GAP SERPL CALC-SCNC: 8 MMOL/L (ref 7–16)
AST SERPL-CCNC: 14 U/L (ref 12–45)
BILIRUB SERPL-MCNC: 0.6 MG/DL (ref 0.1–1.5)
BUN SERPL-MCNC: 9 MG/DL (ref 8–22)
CALCIUM ALBUM COR SERPL-MCNC: 9 MG/DL (ref 8.5–10.5)
CALCIUM SERPL-MCNC: 9.1 MG/DL (ref 8.5–10.5)
CHLORIDE SERPL-SCNC: 106 MMOL/L (ref 96–112)
CHOLEST SERPL-MCNC: 143 MG/DL (ref 100–199)
CO2 SERPL-SCNC: 25 MMOL/L (ref 20–33)
CREAT SERPL-MCNC: 0.74 MG/DL (ref 0.5–1.4)
EST. AVERAGE GLUCOSE BLD GHB EST-MCNC: 108 MG/DL
FASTING STATUS PATIENT QL REPORTED: NORMAL
GFR SERPLBLD CREATININE-BSD FMLA CKD-EPI: 107 ML/MIN/1.73 M 2
GLOBULIN SER CALC-MCNC: 3.1 G/DL (ref 1.9–3.5)
GLUCOSE SERPL-MCNC: 100 MG/DL (ref 65–99)
HBA1C MFR BLD: 5.4 % (ref 4–5.6)
HCV AB SER QL: NORMAL
HDLC SERPL-MCNC: 56 MG/DL
HIV 1+2 AB+HIV1 P24 AG SERPL QL IA: NORMAL
LDLC SERPL CALC-MCNC: 74 MG/DL
POTASSIUM SERPL-SCNC: 4.3 MMOL/L (ref 3.6–5.5)
PROT SERPL-MCNC: 7.2 G/DL (ref 6–8.2)
SODIUM SERPL-SCNC: 139 MMOL/L (ref 135–145)
T4 FREE SERPL-MCNC: 1.12 NG/DL (ref 0.93–1.7)
TRIGL SERPL-MCNC: 63 MG/DL (ref 0–149)
TSH SERPL DL<=0.005 MIU/L-ACNC: 0.81 UIU/ML (ref 0.38–5.33)

## 2023-12-19 PROCEDURE — 36415 COLL VENOUS BLD VENIPUNCTURE: CPT

## 2023-12-19 PROCEDURE — 82306 VITAMIN D 25 HYDROXY: CPT

## 2023-12-19 PROCEDURE — 80053 COMPREHEN METABOLIC PANEL: CPT

## 2023-12-19 PROCEDURE — 86803 HEPATITIS C AB TEST: CPT

## 2023-12-19 PROCEDURE — 84443 ASSAY THYROID STIM HORMONE: CPT

## 2023-12-19 PROCEDURE — 84439 ASSAY OF FREE THYROXINE: CPT

## 2023-12-19 PROCEDURE — 87389 HIV-1 AG W/HIV-1&-2 AB AG IA: CPT

## 2023-12-19 PROCEDURE — 83036 HEMOGLOBIN GLYCOSYLATED A1C: CPT

## 2023-12-19 PROCEDURE — 80061 LIPID PANEL: CPT

## 2023-12-26 PROBLEM — R68.89 FLU-LIKE SYMPTOMS: Status: ACTIVE | Noted: 2023-12-26

## 2023-12-26 PROBLEM — K59.00 CONSTIPATION: Status: ACTIVE | Noted: 2023-12-26

## 2023-12-26 NOTE — ASSESSMENT & PLAN NOTE
Acute -   Ordered Covid/ flu swab in clinic-   Advised to stay well hydrated and use OTC cold medications as well as fever reducers.   Likely viral   She is due for preventative labs, have ordered but will have her complete when she is feeling improved

## 2023-12-26 NOTE — ASSESSMENT & PLAN NOTE
Ongoing-   Discussed dietary changes with patient that may help  Increase hydration   Discussed using Miralax to help with bowel movements

## 2023-12-26 NOTE — PROGRESS NOTES
Chief Complaint   Patient presents with    Irregular Heart Beat    Other     Stomach pain       Subjective:     HPI:   Teresa Mike is a 38 y.o. female here to discuss the evaluation and management of:      Problem   Constipation    Patient here with complaints of constipation. Has been trying increased hydration and OTC medications without success.      Flu-Like Symptoms    Patient has been having flu like symptoms. Is concerned that she has caught a cold. Cough, congestion, no fevers.          ROS  See HPI     No Known Allergies    Current medicines (including changes today)  Current Outpatient Medications   Medication Sig Dispense Refill    polyethylene glycol 3350 (MIRALAX) 17 GM/SCOOP Powder Take 17 g by mouth every day. 850 g 3    ergocalciferol (DRISDOL) 85119 UNIT capsule Take 1 Capsule by mouth every 7 days. 12 Capsule 0    hydrocortisone rectal (PERIANAL) 2.5% Cream Insert 1 Application into the rectum every day. 30 g 0     No current facility-administered medications for this visit.       Social History     Tobacco Use    Smoking status: Former    Smokeless tobacco: Never    Tobacco comments:     Pt state quit date is DEC2021 CS05/03/2021   Vaping Use    Vaping Use: Never used   Substance Use Topics    Alcohol use: No    Drug use: Yes     Types: Marijuana, Oral     Comment: Pt states she uses 2-3 time per week to help with anxiety and sleep since her  passed apx 8 mo. ago CS05/03/2021       Patient Active Problem List    Diagnosis Date Noted    Constipation 12/26/2023    Flu-like symptoms 12/26/2023    Vitamin D deficiency 11/28/2022    Hemorrhoids 11/28/2022    Dandruff in adult 11/28/2022    Situational depression 05/03/2022       Family History   Problem Relation Age of Onset    Hypertension Mother     Hypertension Maternal Aunt     Breast Cancer Maternal Aunt 60    Hypertension Maternal Grandmother           Objective:     /82 (BP Location: Left arm, Patient Position: Sitting, BP  "Cuff Size: Adult long)   Pulse 72   Temp 36.3 °C (97.3 °F) (Temporal)   Resp 17   Ht 1.626 m (5' 4\")   Wt 96.4 kg (212 lb 9.6 oz)   SpO2 97%  Body mass index is 36.49 kg/m².    Physical Exam:  Physical Exam  Vitals reviewed.   Constitutional:       General: She is awake.      Appearance: Normal appearance. She is well-developed. She is obese.   HENT:      Head: Normocephalic.      Nose: Congestion present.      Mouth/Throat:      Pharynx: Oropharynx is clear.   Eyes:      Conjunctiva/sclera: Conjunctivae normal.   Cardiovascular:      Rate and Rhythm: Normal rate and regular rhythm.      Heart sounds: Normal heart sounds.   Pulmonary:      Effort: Pulmonary effort is normal. No respiratory distress.      Breath sounds: Normal breath sounds. No wheezing.   Musculoskeletal:      Cervical back: Neck supple.   Skin:     General: Skin is warm and dry.   Neurological:      Mental Status: She is alert and oriented to person, place, and time.   Psychiatric:         Mood and Affect: Mood normal.         Behavior: Behavior normal. Behavior is cooperative.              Assessment and Plan:     The following treatment plan was discussed:    Problem List Items Addressed This Visit       Vitamin D deficiency    Relevant Orders    VITAMIN D,25 HYDROXY (DEFICIENCY) (Completed)    Constipation     Ongoing-   Discussed dietary changes with patient that may help  Increase hydration   Discussed using Miralax to help with bowel movements          Relevant Medications    polyethylene glycol 3350 (MIRALAX) 17 GM/SCOOP Powder    Flu-like symptoms     Acute -   Ordered Covid/ flu swab in clinic-   Advised to stay well hydrated and use OTC cold medications as well as fever reducers.   Likely viral   She is due for preventative labs, have ordered but will have her complete when she is feeling improved          Relevant Orders    CoV-2, Flu A/B, And RSV by PCR ([facility])     Other Visit Diagnoses       Class 2 obesity due to excess " calories with body mass index (BMI) of 36.0 to 36.9 in adult, unspecified whether serious comorbidity present        Relevant Orders    HEMOGLOBIN A1C (Completed)    Lipid Profile (Completed)    TSH (Completed)    FREE THYROXINE (Completed)    Comp Metabolic Panel (Completed)    Need for hepatitis C screening test        Relevant Orders    HEP C VIRUS ANTIBODY (Completed)    Routine screening for STI (sexually transmitted infection)        Relevant Orders    HIV AG/AB COMBO ASSAY SCREENING (Completed)            Any change or worsening of signs or symptoms, patient encouraged to follow-up or report to emergency room for further evaluation. Patient verbalizes understanding and agrees.    Follow-Up: Follow up as needed       PLEASE NOTE: This dictation was created using voice recognition software. I have made every reasonable attempt to correct obvious errors, but I expect that there are errors of grammar and possibly content that I did not discover before finalizing the note.

## 2025-10-15 ENCOUNTER — APPOINTMENT (OUTPATIENT)
Dept: MEDICAL GROUP | Facility: MEDICAL CENTER | Age: 40
End: 2025-10-15
Payer: MEDICAID